# Patient Record
(demographics unavailable — no encounter records)

---

## 2025-03-27 NOTE — REASON FOR VISIT
[Follow-up Device Check] : is here today for a follow-up device check visit for [Arrhythmias (seen on stored data)] : arrhythmias seen on stored data [Language Line ] : provided by Language Line   [Interpreters_IDNumber] : [id number] [Interpreters_FullName] : Wai

## 2025-03-27 NOTE — CARDIOLOGY SUMMARY
[de-identified] : (07/30/2024): Sinus Bradycardia @ 54 bpm (04/24/2023): Sinus Bradycardia @ 51 bpm (03/07/2023): sinus rhythm at 67 BPM, no significant ST/T wave abnormalities. Artifact. [de-identified] : (1/25/2023): 2D Echo normal LV systolic function. DDI. No pericardial effusion. Previous AI. [de-identified] : 03/23/2023: NANCY (MDT) implanted

## 2025-03-27 NOTE — PROCEDURE
[No] : not [NSR] : normal sinus rhythm [See Device Printout] : See device printout [Longevity: ___ months] : The estimated remaining battery life is [unfilled] months [None] : none [Sensing Amplitude ___mv] : sensing amplitude was [unfilled] mv [de-identified] : Medtronic [de-identified] : LINQ II [de-identified] : [id number] [de-identified] : 03/23/2023 [de-identified] : Good [de-identified] : No events

## 2025-03-27 NOTE — DISCUSSION/SUMMARY
[FreeTextEntry1] : Mr. Gil Maya is a pleasant 79-year-old male with Hypertension, Hyperlipidemia, DMII, CAD S/P PCI, and Acute Ischemic Stroke in March of 2021, PAF diagnosed on Jan 2024 for 18 minutes. Patient had a negative Transthoracic and Transesophageal Echo. MCOT was given but patient could not wear it more than 3 days due to skin rash. Patient has no clinical diagnosis of Atrial Fibrillation. He underwent ILR implantation for long-term detection of Atrial Fibrillation on 3/23/2023.  # CVA s/p ILR 03/23/2023 - Device interrogation normal. I interrogated and reprogrammed the device as described above.  - No new events. - The patient is on remote and transmitting.   # PAF  CHADsVASc of at least 7 (stroke 2, age, HTN, DM, CAD) - Patient reports compliance with Eliquis 5 mg q12h and Aspirin 81 mg daily.  - No s/s bleeding reported. Labs with PCP or gen cards  # HTN - at goal - Low sodium diet reinforced. - Follow-up with the cardiologist or the PCP for BP f/u.  I have also advised the patient to go to the nearest emergency room if he experiences any chest pain, dyspnea, syncope, or has any other compelling symptoms.   - Patient told no EP contraindications to using Viagra as prescribed.   I interrogated and reprogrammed his device as described in procedure. His wound is healed properly, with no signs of inflammation, infection or bleeding. I discussed with patient plan of care in great details. 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 I answered all his questions to his satisfaction. Patient was pleased with the visit.    Patient will follow with me in 9-12 months' time. Please do not hesitate to contact me at [phone] if you have any further questions regarding this patient care.

## 2025-03-27 NOTE — HISTORY OF PRESENT ILLNESS
[FreeTextEntry1] : Hypertension, Hyperlipidemia, DMII, CAD S/P PCI, Acute Punctuate Stroke in March of 2021, PAF (18 minutes Jan 2024)Cardiac workup negative including NEHA and Trans-Thoracic Echo, presents today for routine device interrogation. He underwent ILR implantation on 03/23/2023, for long-term detection of Atrial Fibrillation.  The patient sustained a CVA in March of 2021. He was started on Aspirin and Plavix. Etiology of stroke is unclear. Patient was given MCOT but could not wear it due to skin rash. He presents for follow up after starting Eliquis after findings of AF on ILR on 1/14/2024. 18 minutes He states that he stopped Eliquis after about 4 days because it made him feel nervous and resumed Plavix. Then restarted back on Eliquis 5 mg q12h and Aspirin 81 mg daily.  7/30/2024: 2 weeks ago had chest pain for 10 minutes while sitting, felt like pressure. no exertional chest pain. no recurrence of chest pain.  He has no chest pain, no shortness of breath, no dyspnea on exertion, no orthopnea, no PND. He denies dizziness, lightheadedness and syncope. He has no exertional symptoms. He presents for evaluation. 9/23/2024: Returns for follow up. The patient is feeling well and has no cardiac complaints. Denies CP, palpitations, SOB, dizziness, SADLER, PND, syncope. He is wanting to know if he can continue taking Viagra. He takes it seldomly.   03/27/2025: Pt presents for routine ILR interrogation.   Today the patient is feeling generally well with no acute complaints. Denies CP, SOB, palpitations, dizziness, syncope.

## 2025-03-27 NOTE — PHYSICAL EXAM
[General Appearance - Well Developed] : well developed [Normal Appearance] : normal appearance [Well Groomed] : well groomed [General Appearance - Well Nourished] : well nourished [No Deformities] : no deformities [General Appearance - In No Acute Distress] : no acute distress [Heart Rate And Rhythm] : heart rate and rhythm were normal [Heart Sounds] : normal S1 and S2 [] : no respiratory distress [Respiration, Rhythm And Depth] : normal respiratory rhythm and effort [Exaggerated Use Of Accessory Muscles For Inspiration] : no accessory muscle use [Well-Healed] : well-healed [Abdomen Soft] : soft [Nail Clubbing] : no clubbing of the fingernails [Cyanosis, Localized] : no localized cyanosis [Well Developed] : well developed [Well Nourished] : well nourished [No Acute Distress] : no acute distress [Normal Conjunctiva] : normal conjunctiva [Normal Venous Pressure] : normal venous pressure [No Carotid Bruit] : no carotid bruit [Normal S1, S2] : normal S1, S2 [No Murmur] : no murmur [No Rub] : no rub [No Gallop] : no gallop [Clear Lung Fields] : clear lung fields [Good Air Entry] : good air entry [No Respiratory Distress] : no respiratory distress  [Soft] : abdomen soft [Non Tender] : non-tender [No Masses/organomegaly] : no masses/organomegaly [Normal Bowel Sounds] : normal bowel sounds [Normal Gait] : normal gait [No Edema] : no edema [No Cyanosis] : no cyanosis [No Clubbing] : no clubbing [No Varicosities] : no varicosities [No Rash] : no rash [No Skin Lesions] : no skin lesions [Moves all extremities] : moves all extremities [No Focal Deficits] : no focal deficits [Normal Speech] : normal speech [Alert and Oriented] : alert and oriented [Normal memory] : normal memory [FreeTextEntry1] : parasternal

## 2025-04-03 NOTE — ASSESSMENT
[FreeTextEntry1] : 1)afib recently detected on loop monitor elevated CHADSVASC score 6  eliquis 5 mg bid for stroke prevention  off plavix  currently sinus rhythm following with EP    2)-CAD history of PCI, last echo normal EF as per chart review currently stable no evidence of ischemia repeated echo normal EF no significant valvular disease   continue asa statin, BB and ARB  3)HTN controlled continue ARB, CCB and BB  4)DL continue statin  controlled LDL < 70   5)DM following PMD not well controlled patient advised with better diet control   medications reviewed and refilled  labs prior to next visit  Follow up in 6 months

## 2025-04-03 NOTE — HISTORY OF PRESENT ILLNESS
[FreeTextEntry1] : I had the pleasure to see Mr Maya today for cardiology follow up.. He is 79 year old male with PMHx of CAD a/p PCI in OSH , HTN,DM DL. Per chart review patient found to have punctuate stroke back on 3/2021. cardiac workup at that time including NEHA and TTE were unremarkable. (no evidence of LA Thrombus or shunt as per chart note).  MCOT was ordered initially to check for underlying pafib however patient was not able to tolerate the detector pad due to contact allergy , referred to EP and loop was inserted  on 1/2024 event of afib detected and patient started on eliquis 5 mg bid.  5/3/2024 echo EF 70% no severe valve disease.  9/2024 patient visited [facility] ED for atypical localized left sided chest pain , ACS ruled out, NST negative for ischemia 3/18/2025 HBA1C 6.8 Cr 0.8 Hb 11.3 ; visited ED for colitis , following GI   Currently patient is stable , denies chest pain shortness of breath , SADLER, palpitations or syncope.  EKG showed sinus rhythm no ischemic changes. HR 56 BP normal   LDL 60 HBA1C 7.8 EKG sinus bradycardia 55, no ischemic changes  NST 9/29/2024 negative for ischemia

## 2025-04-03 NOTE — PHYSICAL EXAM
Detail Level: Detailed [Normal] : no edema, no cyanosis, no clubbing, no varicosities [Moves all extremities] : moves all extremities [Alert and Oriented] : alert and oriented

## 2025-04-23 NOTE — DISCUSSION/SUMMARY
[Antithrombotic therapy with ___] : antithrombotic therapy with  [unfilled] [Lipid Lowering Therapy] : lipid lowering therapy [Patient encouraged to discuss with Primary MD] : I encouraged the patient to discuss these important issues with ~his/her~ primary care doctor [Goals and Counseling] : I have reviewed the goals of stroke risk factor modification. I counseled the patient on measures to reduce stroke risk, including the importance of medication compliance, risk factor control, exercise, healthy diet and avoidance of smoking. I reviewed stroke warning signs and symptoms and appropriate actions to take if such occur.

## 2025-04-23 NOTE — PHYSICAL EXAM
[FreeTextEntry1] : Mental Status: Alert and Attentive, oriented to person, place, and time. Speech is fluent with intact comprehension, no dysarthria. Recent and remote memory intact. Insight and judgment were intact, and the affect was normal. Follows commands. Attention/concentration intact. Fund of knowledge appropriate.   Cranial Nerves: VFF to confrontation, EOMi w/o nystagmus, anisocoria R>L, L pupil reactive to light, R sluggish reaction to light, V1-V3 intact bilaterally and symmetric, face is symmetric with normal eye closure and smile, hard of hearing b/l hearing aids present, uvula is midline and soft palate rises symmetrically, head turning, and shoulder shrug are symmetric, tongue protrudes midline.   Motor: MRC grading 5/5 bilateral UE/LE.  strength 5/5. Normal tone and bulk. No pronator drift. Rapid alternating movements intact and symmetric.   Sensory exam: Sensation intact to light touch bilaterally in all extremities. No neglect or extinction on double simultaneous testing.   Coordination: No dysmetria on finger-to-nose.   Gait: Narrow and steady.    Deep tendon reflexes: Biceps right 2+. Biceps left 2+. Brachioradialis right 2+. Brachioradialis left 2+. Patella right 2+. Patella left 2+.

## 2025-04-23 NOTE — REASON FOR VISIT
[Post Hospitalization] : a post hospitalization visit [Spouse] : spouse [Family Member] : family member [Patient Declined  Services] : - None: Patient declined  services [FreeTextEntry3] : Daughter provided supplemental history and translation.

## 2025-04-23 NOTE — ASSESSMENT
[FreeTextEntry1] : Mr. Maya is a 79-year-old Italian/English-speaking gentleman with PMH of DM, HTN, HLD, CAD s/p PCI (on ASA), L hemisphere scattered strokes (03/2021) s/p ILR, and Afib (on Eliquis), accompanied by wife and daughter for a hospital follow up visit.  # L Frontal and Parietal lobe ischemic infarcts etiology likely 2/2 Afib in the setting of missed Eliquis doses vs less likely underlying metastatic disease (~20 lb weight loss over 1 year). Initially presented to the ED on 04/12/25 with headache and L facial and LUE numbness and tingling. CTH: No acute intracranial pathology. CTP: No perfusion deficits. CTA H/N: Mild stenosis of the proximal L cervical ICA (35%). TTE: EF 70-75%, Normal LA, (-) PFO. EKG: SB. HgA1C: 6.8%, LDL: 66, TSH: 5.79 (H). NIHSS: 0 mRS: 0.   Plan - Cont. Eliquis + ASA as prescribed. Discussed importance of medication adherence. Advised to set alarm/reminder for medications. - Cont. Rosuvastatin as prescribed, LDL goal <70 (66) - Cont. HgA1C optimization, goal <7.0% (6.8%) - Optimize BP, goal <130/80 mm Hg  - Obtain CUS (L ICA stenosis) - F/U Cardio Dr. Boateng - Referral to endocrinology / elevated TSH/DM management - Referral to nutritionist - Referral to pulm - ADRY eval  - Referral to ophthalmology - cataracts - Referral to VNS for medication management assistance  - Referral to new PCP for routine health maintenance, prevention, screenings, and risk factor management  - Counselled on signs & symptoms of stroke: BE FAST and ED return precautions    F/U in 3 mos.

## 2025-04-23 NOTE — DATA REVIEWED
[de-identified] : MRI: 1. Patchy punctate acute cortical infarcts within the left frontal and parietal lobes.  2. Moderate chronic microvascular changes and a small chronic infarct within the high left frontal lobe.  [de-identified] : 04/12/25 CTH: No evidence of acute intracranial abnormality.      CT PERFUSION: Core infarction: 0 ml  Penumbra / tissue at risk for active ischemia: 0 ml    CTA NECK: Stable mild (35%) stenosis of the proximal left cervical ICA.  CTA HEAD: No large vessel occlusion, significant stenosis or vascular abnormality identified.   04/12/25 TTE:   1. Hyperdynamic global left ventricular systolic function with ejection fraction, by visual estimation, of 70 to 75%. Normal diastolic function. No regional wall motion abnormalities.  2. Normal right ventricular size and function.  3. Normal atria.  4. No hemodynamically significant valvular disease.  5. Borderline dilatation of the ascending aorta (3.9cm, 2.27cm/m2).  6. With infusion of echocontrast there is no evidence of LV thrombus.  7. Color flow doppler and intravenous injection of agitated saline demonstrates the presence of an intact intra atrial septum.      EKG:    ILR interrogated: 2 PAF episodes in January 2024 2 and 18 min. Total AT/AF burden 0%.

## 2025-04-23 NOTE — HISTORY OF PRESENT ILLNESS
[FreeTextEntry1] : Mr. Maya is a 79-year-old Slovak/English-speaking gentleman with PMH of DM, HTN, HLD, CAD s/p PCI (on ASA), L hemisphere scattered strokes (03/2021) s/p ILR, and Afib (on Eliquis), accompanied by wife and daughter for a hospital follow up visit.   Patient initially presented to the ED on 04/12/25 with headache and L facial and LUE numbness and tingling. Per daughter, he wokeup with symptoms that progressively worsened and presented to ED. Neurological workup revealed punctate acute cortical infarcts within the L frontal and parietal lobes. CTA H/N: showed stable mild stenosis of the proximal L ICA but otherwise did not reveal any flow-limiting stenosis. PTA was on Eliquis and ASA. Per chart review, patient denies any missed doses of medication. There was also a concern for possible malignancy as patient had a 20 lb weight loss over 1 year.   Since discharge, denies any new neurological symptoms. Feels back to baseline. Upon further investigation, patient recalls he forgot to take 2 doses of his morning Eliquis prior to symptom presentation. Now endorses compliance with medications, tolerating well w/o issue. Saw his PCP last week and per daughter was pending a "Screen for CA" but no one has reached out to her. She is requesting a referral to a new PCP. Monitors f/s at home, typically around 140s. Monitors BP at home, around 120s-130s/60s. Has not seen his cardiologist Dr. Boateng since d/c. Daughter states he has cataracts and is asking for a referral to ophthalmology. Independent of ADLs, lives with his wife. Never smoker. Denies any alcohol or drug use. Active, walks about 30-60 mins daily. Reports diet can be improved. Wife endorses loud snoring at night. Daughter states he doesn't snore when he naps in his recliner. Never evaluated for ADRY. Not currently receiving outpatient services. Patient is inquiring in VNS services for assistance with medication management. Retired from [employer] at [employer]. Denies any further complaints.

## 2025-05-12 NOTE — HISTORY OF PRESENT ILLNESS
[FreeTextEntry1] : I had the pleasure to see Mr Maya today for cardiology follow up.. He is 79 year old male with PMHx of CAD a/p PCI in OSH , HTN,DM DL. Per chart review patient found to have punctuate stroke back on 3/2021. cardiac workup at that time including NEHA and TTE were unremarkable. (no evidence of LA Thrombus or shunt as per chart note).  MCOT was ordered initially to check for underlying pafib however patient was not able to tolerate the detector pad due to contact allergy , referred to EP and loop was inserted  on 2024 event of afib detected and patient started on eliquis 5 mg bid.  5/3/2024 echo EF 70% no severe valve disease.  2024 patient visited [facility] ED for atypical localized left sided chest pain , ACS ruled out, NST negative for ischemia 3/18/2025 HBA1C 6.8 Cr 0.8 Hb 11.3 ; visited ED for colitis , following GI  NST 2024 negative for ischemia  25: HDL: 30, T, LDL: 57, A1C: 6.7, H/H: 12/36 2025 admitted to hospital for acute ischemic stoke, chart reviewed, patient missed some doses of eliquis CT neck results noted, no severe vessel stenosis.  Echo repeated showed normal EF, no severe valve disease 5/10/2025 patient currently stable, no chest pain, sob or mojica, no palpitations. no syncope.

## 2025-05-12 NOTE — ASSESSMENT
[FreeTextEntry1] : #recent hospitalization for acute ischemic stroke likely related to missing some dose of eliquis explain the importance to be compliant with medication.  appreciate neuro eval and follow up  continue eliquis 5m bid continue asa 81 mg daily  continue statin   #afib elevated CHADSVASC score 6  eliquis 5 mg bid for stroke prevention  continue asa  currently sinus rhythm following with EP    2)-CAD history of PCI, last echo normal EF as per chart review currently stable no evidence of ischemia repeated echo normal EF no significant valvular disease   continue asa statin, BB and ARB  3)HTN controlled continue ARB, CCB and BB  4)DL continue statin  controlled LDL < 70   5)DM following PMD not well controlled patient advised with better diet control  endocrino referral   hospital chart reviewed  labs  Follow up in 3 months

## 2025-05-12 NOTE — HISTORY OF PRESENT ILLNESS
[FreeTextEntry1] : I had the pleasure to see Mr Maya today for cardiology follow up.. He is 79 year old male with PMHx of CAD a/p PCI in OSH , HTN,DM DL. Per chart review patient found to have punctuate stroke back on 3/2021. cardiac workup at that time including NEHA and TTE were unremarkable. (no evidence of LA Thrombus or shunt as per chart note).  MCOT was ordered initially to check for underlying pafib however patient was not able to tolerate the detector pad due to contact allergy , referred to EP and loop was inserted  on 2024 event of afib detected and patient started on eliquis 5 mg bid.  5/3/2024 echo EF 70% no severe valve disease.  2024 patient visited [facility] ED for atypical localized left sided chest pain , ACS ruled out, NST negative for ischemia 3/18/2025 HBA1C 6.8 Cr 0.8 Hb 11.3 ; visited ED for colitis , following GI  NST 2024 negative for ischemia  25: HDL: 30, T, LDL: 57, A1C: 6.7, H/H: 12/36 2025 admitted to hospital for acute ischemic stoke, chart reviewed, patient missed some doses of eliquis CT neck results noted, no severe vessel stenosis.  Echo repeated showed normal EF, no severe valve disease 5/10/2025 patient currently stable, no chest pain, sob or mojica, no palpitations. no syncope.

## 2025-06-02 NOTE — ASSESSMENT
[FreeTextEntry1] : Mr. SINHA 79 year old male presents for a HFU visit for BL hand numbness. Was recently seen in office by MEGAN Bustillo post CVA.   PMH of DM, HTN, HLD, CAD s/p PCI (on ASA), L hemisphere scattered strokes (03/2021) s/p ILR, and Afib (on Eliquis)  Reports no new neurologic symptoms since ER visit, no recurrence of hand numbness. Will send for paresthesia serology.   Plan:  -Continue ASA 81 mg QD, Eliquis 5 mg BID.  -Continue Rosuvastatin  -Continue BP management  -Continue w Vascular management, Goal LDL <70, A1c <7 -Follow up w Cardio and ensure no events on ILR  -Follow up w Ortho pending for hip pain  -Patient educated on stroke symptoms and when to seek immediate medical attention  RTC 3 mo

## 2025-06-02 NOTE — PHYSICAL EXAM
[FreeTextEntry1] : Mental Status: Alert and Attentive, oriented to person, place, and time. Speech is fluent with intact comprehension, no dysarthria. Follows commands.   Cranial Nerves: VFF to confrontation, EOMi w/o nystagmus, anisocoria R>L, L pupil reactive to light, R sluggish reaction to light, V1-V3 intact bilaterally and symmetric, face is symmetric with normal eye closure and smile, hard of hearing b/l hearing aids present, uvula is midline and soft palate rises symmetrically, tongue protrudes midline.  Motor: MRC grading 5/5 bilateral UE/LE except R hip flexion/knee extension 4/5 secondary to pain.  strength 5/5. Normal tone and bulk.   Sensory exam: Sensation intact to light touch, temperature, vibration, pp throughout   Coordination: No dysmetria on finger-to-nose.  Gait: Narrow and steady.  Deep tendon reflexes: Biceps right 2+. Biceps left 2+. Brachioradialis right 2+. Brachioradialis left 2+. Patella right 2+. Patella left 2+.

## 2025-06-02 NOTE — HISTORY OF PRESENT ILLNESS
[FreeTextEntry1] : Mr. SINHA 79 year old male presents for a hospital follow up visit following BL hand numbness.  w/ PMHx of L multi-punctate stroke (2021), recent L frontoparietal stroke (April 2025), Afib on Eliquis, CAD s/p PCI on aspirin 81, HLD, and DM   Was recently seen by MEGAN Bustillo in office for stroke management following CVA April 2025. On Eliquis and ASA, Rosuvastatin.   Son present w patient to assist w translation. In ER, Negative CTH and stable CTA, per neurology note there was low suspicion for stroke due to nature of symptoms. Reports since ER visit has not had any symptom recurrence and denies new neurologic symptoms. Reports the day of ER visit he sat on the couch after being out all day and felt the BL hand numbness. He was anxious after but not leading up to the event. Says he was out, his hands felt heavy and numb when he got home. Denies associated weakness. No dehydration. No changes otherwise in neuro symptoms. He reports feeling like his legs are heavy while walking but has pain at his right hip and is pending f/u w ortho. This has been going on since CVA April 2025.

## 2025-06-03 NOTE — ASSESSMENT
[FreeTextEntry1] : Stable BPH continue finasteride.  PSA ordered.  Lower urinary tract symptoms.  Continue tamsulosin 0.4 twice daily.  Discussed importance of better glycemic control. [Urinary Symptom or Sign (788.99\R39.89)] : implantation

## 2025-06-03 NOTE — HISTORY OF PRESENT ILLNESS
[FreeTextEntry1] : 79-year-old with history of BPH continuing finasteride 5 mg daily.  No recent PSA. Previous history of elevated PSA and benign biopsy over 11 years ago.  Family history of prostate cancer in his father. Lower urinary tract symptoms continue tamsulosin 0.4 twice daily.  He notices recently increased frequency and nocturia now x 4.  He had a recent CVA, and his hemoglobin A1c is 6.8.  Reviewed labs from 4/1/2025.  Urinalysis negative, hemoglobin A1c 6.7.  CT scan on March 17, 2025 done for colitis showed normal kidneys.

## 2025-06-23 NOTE — ASSESSMENT
[FreeTextEntry1] : History of stroke Paroxysmal atrial fibrillation Rule out obstructive sleep apnea In lab sleep study ordered He is known to snore, has daytime fatigue 2 months follow-up over the phone

## 2025-06-23 NOTE — HISTORY OF PRESENT ILLNESS
[TextBox_4] : 79-year-old man with history of atrial fibrillation on anticoagulation, hypertension, hyperlipidemia, history of stroke, follows with neurology and with cardiology/EP, sent in essentially to rule out sleep apnea.  He is known to snore at night, has some daytime sleepiness and fatigue, has paroxysmal atrial fibrillation, and history of stroke.  The plan is to do an in lab sleep study.